# Patient Record
Sex: FEMALE | Employment: OTHER | URBAN - METROPOLITAN AREA
[De-identification: names, ages, dates, MRNs, and addresses within clinical notes are randomized per-mention and may not be internally consistent; named-entity substitution may affect disease eponyms.]

---

## 2022-11-09 ENCOUNTER — OFFICE VISIT (OUTPATIENT)
Dept: UROGYNECOLOGY | Age: 72
End: 2022-11-09
Payer: MEDICARE

## 2022-11-09 VITALS
OXYGEN SATURATION: 99 % | HEART RATE: 59 BPM | DIASTOLIC BLOOD PRESSURE: 58 MMHG | RESPIRATION RATE: 16 BRPM | TEMPERATURE: 97.8 F | SYSTOLIC BLOOD PRESSURE: 150 MMHG

## 2022-11-09 DIAGNOSIS — R30.0 DYSURIA: ICD-10-CM

## 2022-11-09 DIAGNOSIS — N39.0 RECURRENT UTI: Primary | ICD-10-CM

## 2022-11-09 DIAGNOSIS — N95.2 VAGINAL ATROPHY: ICD-10-CM

## 2022-11-09 LAB
BILIRUBIN, POC: NORMAL
BLOOD URINE, POC: NORMAL
CLARITY, POC: CLEAR
COLOR, POC: YELLOW
GLUCOSE URINE, POC: NORMAL
KETONES, POC: NORMAL
LEUKOCYTE EST, POC: NORMAL
NITRITE, POC: NORMAL
PH, POC: 6.5
PROTEIN, POC: NORMAL
SPECIFIC GRAVITY, POC: 1.01
UROBILINOGEN, POC: NORMAL

## 2022-11-09 PROCEDURE — 1123F ACP DISCUSS/DSCN MKR DOCD: CPT | Performed by: NURSE PRACTITIONER

## 2022-11-09 PROCEDURE — 1036F TOBACCO NON-USER: CPT | Performed by: NURSE PRACTITIONER

## 2022-11-09 PROCEDURE — G8421 BMI NOT CALCULATED: HCPCS | Performed by: NURSE PRACTITIONER

## 2022-11-09 PROCEDURE — 81002 URINALYSIS NONAUTO W/O SCOPE: CPT | Performed by: NURSE PRACTITIONER

## 2022-11-09 PROCEDURE — 99204 OFFICE O/P NEW MOD 45 MIN: CPT | Performed by: NURSE PRACTITIONER

## 2022-11-09 PROCEDURE — G8484 FLU IMMUNIZE NO ADMIN: HCPCS | Performed by: NURSE PRACTITIONER

## 2022-11-09 PROCEDURE — 3017F COLORECTAL CA SCREEN DOC REV: CPT | Performed by: NURSE PRACTITIONER

## 2022-11-09 PROCEDURE — G8400 PT W/DXA NO RESULTS DOC: HCPCS | Performed by: NURSE PRACTITIONER

## 2022-11-09 PROCEDURE — 51701 INSERT BLADDER CATHETER: CPT | Performed by: NURSE PRACTITIONER

## 2022-11-09 PROCEDURE — G8427 DOCREV CUR MEDS BY ELIG CLIN: HCPCS | Performed by: NURSE PRACTITIONER

## 2022-11-09 PROCEDURE — 1090F PRES/ABSN URINE INCON ASSESS: CPT | Performed by: NURSE PRACTITIONER

## 2022-11-09 RX ORDER — FLUCONAZOLE 150 MG/1
150 TABLET ORAL ONCE
Qty: 1 TABLET | Refills: 0 | Status: SHIPPED | OUTPATIENT
Start: 2022-11-09 | End: 2022-11-09

## 2022-11-09 RX ORDER — PHENAZOPYRIDINE HYDROCHLORIDE 100 MG/1
100 TABLET, FILM COATED ORAL 3 TIMES DAILY PRN
Qty: 18 TABLET | Refills: 0 | Status: SHIPPED | OUTPATIENT
Start: 2022-11-09 | End: 2022-11-15

## 2022-11-09 RX ORDER — METOPROLOL SUCCINATE 50 MG/1
TABLET, EXTENDED RELEASE ORAL
COMMUNITY
Start: 2022-11-04

## 2022-11-09 RX ORDER — SODIUM FLUORIDE AND POTASSIUM NITRATE 5.8; 57.5 MG/ML; MG/ML
GEL, DENTIFRICE DENTAL
COMMUNITY
Start: 2021-01-24

## 2022-11-09 RX ORDER — PHENAZOPYRIDINE HYDROCHLORIDE 200 MG/1
TABLET, FILM COATED ORAL
COMMUNITY
Start: 2022-11-01

## 2022-11-09 RX ORDER — ATORVASTATIN CALCIUM 20 MG/1
20 TABLET, FILM COATED ORAL DAILY
COMMUNITY
Start: 2022-10-07

## 2022-11-09 RX ORDER — OMEPRAZOLE 40 MG/1
40 CAPSULE, DELAYED RELEASE ORAL 2 TIMES DAILY
COMMUNITY
Start: 2022-10-07

## 2022-11-09 RX ORDER — CEFDINIR 300 MG/1
300 CAPSULE ORAL 2 TIMES DAILY
Qty: 14 CAPSULE | Refills: 0 | Status: SHIPPED | OUTPATIENT
Start: 2022-11-09 | End: 2022-11-16

## 2022-11-09 RX ORDER — ELUXADOLINE 100 MG/1
TABLET, FILM COATED ORAL
COMMUNITY
Start: 2022-10-07

## 2022-11-09 RX ORDER — ESCITALOPRAM OXALATE 10 MG/1
5 TABLET ORAL 2 TIMES DAILY
COMMUNITY
Start: 2021-10-06

## 2022-11-09 RX ORDER — LOPERAMIDE HYDROCHLORIDE 2 MG/1
4 CAPSULE ORAL 3 TIMES DAILY PRN
COMMUNITY

## 2022-11-09 RX ORDER — GABAPENTIN 300 MG/1
CAPSULE ORAL
COMMUNITY
Start: 2022-08-11

## 2022-11-09 RX ORDER — CEFDINIR 300 MG/1
CAPSULE ORAL
COMMUNITY
Start: 2022-10-28

## 2022-11-09 RX ORDER — METHENAMINE, SODIUM PHOSPHATE, MONOBASIC, MONOHYDRATE, PHENYL SALICYLATE, METHYLENE BLUE, AND HYOSCYAMINE SULFATE 120; 40.8; 36; 10; .12 MG/1; MG/1; MG/1; MG/1; MG/1
CAPSULE ORAL
COMMUNITY
Start: 2022-10-03

## 2022-11-09 RX ORDER — ESTRADIOL 0.1 MG/G
CREAM VAGINAL
COMMUNITY
Start: 2022-08-10

## 2022-11-09 RX ORDER — ZOLPIDEM TARTRATE 10 MG/1
TABLET ORAL
COMMUNITY
Start: 2022-08-08

## 2022-11-09 ASSESSMENT — ENCOUNTER SYMPTOMS
SINUS PAIN: 1
DIARRHEA: 1
SINUS PRESSURE: 1

## 2022-11-09 NOTE — PROGRESS NOTES
11/9/2022      HPI:     Name: De Acuna  YOB: 1950    CC: Patient is a 67 y.o. female who is seen in consultation from No ref. provider found   for evaluation of  recurrent UTI's . HPI: Patient and  present today to transfer care for ongoing recurrent UTI's  Has seen Dr. Brandi Mena, Dr. Lainey Mondragon and Dr. Maricruz Wilhelm in the past  Was on Monural q 8 days and switched to Trimpex in September   Fosfomycin was helpful for almost 8 years, then no longer worked. Hysterectomy and prolapse repairs with Dr. Lainey Mondragon > 20 years ago. Believes she had UTI's at that time as well. Current therapy:   Cranberry supplement and topical estrogen cream and daily Macrodantin 50 mg.  (Macrodantin was left over and patient chose to start it after last treatment while waiting for this visit)     10/28/22: Klebsiella (R: bactrim, I: Macrobid) treated with Cefdinir and feels better. Completed treatment 11/4/22  10/10/22: Klebsiella  10/1/22: Neg/MBU  9/15/22: Klebsiella  8/31/22: Klebsiella  7/20/22: Klebsiella  All are intermediate susceptibility to Nitrofurantoin    Triggers: intimacy and FI accidents secondary to IBS  Denies prolapse  Washed aggressively with washcloth after each urination until recently advised to stop by ARNP    Has not had cystoscopy: has been advised of need. Cx her appt at [facility] had [facility]scan:    10/06/2022 3:56 PM EDT    1. No acute findings or abnormalities to explain the patient's symptoms. 2. Incidental findings as described        Note: Radiology results need to be interpreted within a comprehensive clinical  context. If you have questions about the radiology report, please contact the  office of the ordering clinician.        Imaging Results - CT ABDOMEN PELVIS W WO CONTRAST (10/06/2022 3:35 PM EDT)  Narrative   10/06/2022 3:56 PM EDT    CLINICAL HISTORY: N39.0-Urinary tract infection, site not specified-ICD-10-CM  N39.0-Urinary tract infection, site not specified-ICD-10-CM. COMPARISON: None. TECHNIQUE: CT ABDOMEN PELVIS W WO CONTRAST on 10/6/2022 3:35 PM. 100 mL of  intravenous Isovue-370 was administered. Dose 1 : CT  DLP Total : 1972.56 mGycm  DLP Spiral Max : 658.2 mGycm  Maximum CTDI Vol : 13.93 mGy  SSDE : 11.0047 mGy  SSDE Diameter : 42.2 cm  SSDE Source : Lat    FINDINGS:     Abdomen: The lung bases are clear. The visualized portions of the heart are  normal.    The liver, gallbladder, spleen, pancreas, adrenal glands, and kidneys are  normal. There is no collecting system calcification or dilation The stomach and  caliber of the small bowel are normal. There are no enlarged abdominal lymph  nodes or free fluid. The caliber of the abdominal aorta is normal. Degenerative  changes are in the spine. Pelvis: The caliber of the colon is normal. The appendix is unremarkable. The  uterus has been removed. There are no enlarged pelvic lymph nodes or free fluid. The urinary bladder and adnexa are normal. The bony pelvis is unremarkable. Bladder control problem: Yes   How many months have you had a bladder problem? 1 years  Do you use pads to absorb lost urine? Yes. If yes how many pads do you wear a day? 1   How many trips do you make to the bathroom during the day? About every 2-3 hours  How many times do you wake at night to go to the bathroom? once  Do you ever wet the bed while asleep? No  Are there times when you cannot make it to the bathroom on time? Yes  Does sound, sight, or feel of running water cause you to lose urine? Yes  How many ounces of liquid do you consume daily? 32 oz  How many drinks containing caffeine do you consume daily?  Caffeine free herbal tea  Which best describes urine loss: (Check all that apply)  [] I lose urine during coughing, sneezing, running, lifting  [] I lose urine with changes in posture, standing, walking  [] I lose urine continuously such that I am constantly wet  [x] I have sudden, urgent needs without the ability to make it to the bathroom (From bed to bathroom only)  Have you seen a physician for complaints of urine loss? No  Have you taken medication to prevent urine loss? No    Bladder emptying problems:  No   Prolapse/Vaginal Support problems: No   Bowel problem(s): Yes  How long have you had bowel symptoms? years  Do you have accidental loss of solid stool? No  Do you have accidental loss of liquid stool? Yes  Do you have accidental loss of gas? Yes  How many episodes per week? Once at most  Do you have constipation? No Do you have diarrhea? Yes Problems with bloating? No  Do you have frequent desire to have a bowel movement? No  Do you feel that your bowels are never completely empty? No  Do you ever place your fingers in your vagina or between the vagina and rectum to help with a bowel movement? No  Have you seen a physician for bowel symptoms? No    Sexual History:  reports that she is not currently sexually active. Pelvic Pain:  Yes  Where is your pain? Vagina  How long have you had pelvic pain? years  Is your pain relieved by bladder emptying? No  Do you have pain with urination? No  Does anything relieve the pain? No        Ob/Gyn History:    OB History    Para Term  AB Living   3 3           SAB IAB Ectopic Molar Multiple Live Births                    # Outcome Date GA Lbr Anderson/2nd Weight Sex Delivery Anes PTL Lv   3 Para            2 Para            1 Para              Past Medical History:   Past Medical History:   Diagnosis Date    Depression     Hypertension      Past Surgical History:   Past Surgical History:   Procedure Laterality Date    HYSTERECTOMY (CERVIX STATUS UNKNOWN)      ZULEMA AND BSO (CERVIX REMOVED)       Allergies:    Allergies   Allergen Reactions    Clindamycin Diarrhea     Nausea   Nausea       Iodinated Diagnostic Agents Hives     IVP Dye  Other reaction(s): hives      Amlodipine Swelling     ankles     Current Medications:  Current Outpatient Medications   Medication Sig Dispense Refill    escitalopram (LEXAPRO) 10 MG tablet Take 5 mg by mouth 2 times daily      omeprazole (PRILOSEC) 40 MG delayed release capsule Take 40 mg by mouth 2 times daily      atorvastatin (LIPITOR) 20 MG tablet Take 20 mg by mouth daily      Sod Fluoride-Potassium Nitrate (PREVIDENT 5000 ENAMEL PROTECT) 1.1-5 % GEL BRUSH WITH PASTE 1-2 TIMES DAILY      cefdinir (OMNICEF) 300 MG capsule Take 1 capsule by mouth 2 times daily for 7 days 14 capsule 0    fluconazole (DIFLUCAN) 150 MG tablet Take 1 tablet by mouth once for 1 dose 1 tablet 0    phenazopyridine (PYRIDIUM) 100 MG tablet Take 1 tablet by mouth 3 times daily as needed for Pain 18 tablet 0    metoprolol succinate (TOPROL XL) 50 MG extended release tablet TAKE 1 TABLET BY MOUTH DAILY      VIBERZI 100 MG TABS TAKE 1 TABLET BY MOUTH TWICE DAILY      estradiol (ESTRACE) 0.1 MG/GM vaginal cream PLACE IN THE VAGINA EVERY THIRD NIGHT      gabapentin (NEURONTIN) 300 MG capsule TAKE 1 CAPSULE BY MOUTH EVERY NIGHT      zolpidem (AMBIEN) 10 MG tablet TAKE 1 TABLET BY MOUTH EVERY NIGHT AS NEEDED FOR SLEEP      phenazopyridine (PYRIDIUM) 200 MG tablet TAKE ONE TABLET BY MOUTH THREE TIMES DAILY      Meth-Hyo-M Bl-Na Phos-Ph Sal (URIBEL) 118 MG CAPS TAKE 1 CAPSULE BY MOUTH EVERY 8 HOURS AS NEEDED FOR BLADDER PAIN      loperamide (IMODIUM) 2 MG capsule Take 4 mg by mouth 3 times daily as needed      cefdinir (OMNICEF) 300 MG capsule TAKE 1 CAPSULE BY MOUTH TWICE DAILY FOR 7 DAYS      calcium citrate-vitamin D (CITRACAL+D) 315-200 MG-UNIT per tablet Take 1 tablet by mouth 2 times daily      Calcium Carbonate-Vitamin D 600-200 MG-UNIT CAPS Take 3 tablets by mouth daily       No current facility-administered medications for this visit.      Social History:   Social History     Socioeconomic History    Marital status:      Spouse name: Not on file    Number of children: Not on file    Years of education: Not on file    Highest education level: Not on file Occupational History    Not on file   Tobacco Use    Smoking status: Never    Smokeless tobacco: Never   Vaping Use    Vaping Use: Never used   Substance and Sexual Activity    Alcohol use: Yes     Alcohol/week: 7.0 standard drinks     Types: 7 Glasses of wine per week    Drug use: Never    Sexual activity: Not Currently   Other Topics Concern    Not on file   Social History Narrative    Not on file     Social Determinants of Health     Financial Resource Strain: Not on file   Food Insecurity: Not on file   Transportation Needs: Not on file   Physical Activity: Not on file   Stress: Not on file   Social Connections: Not on file   Intimate Partner Violence: Not on file   Housing Stability: Not on file     Family History:   Family History   Problem Relation Age of Onset    Hypertension Mother     Hypertension Father     Cancer Father      Review of Systems:  Review of Systems   HENT:  Positive for postnasal drip, sinus pressure and sinus pain. Gastrointestinal:  Positive for diarrhea. Genitourinary:  Positive for enuresis and frequency. Hematological:  Bruises/bleeds easily. All other systems reviewed and are negative. Objective:     Vital Signs  Vitals:    11/09/22 1303   BP: (!) 150/58   Pulse: 59   Resp: 16   Temp: 97.8 °F (36.6 °C)   SpO2: 99%     Physical Exam  Physical Exam  Vitals and nursing note reviewed. Constitutional:       Appearance: Normal appearance. HENT:      Head: Normocephalic. Eyes:      Conjunctiva/sclera: Conjunctivae normal.   Cardiovascular:      Rate and Rhythm: Normal rate. Pulmonary:      Effort: Pulmonary effort is normal.   Genitourinary:     Comments: Large amount of estrogen cream  Musculoskeletal:         General: Normal range of motion. Cervical back: Normal range of motion. Skin:     General: Skin is warm and dry. Neurological:      General: No focal deficit present. Mental Status: She is alert.    Psychiatric:         Mood and Affect: Mood normal.         Behavior: Behavior normal.           Office Fill Study/Urine Dip:       Results for POC orders placed in visit on 11/09/22   POCT Urinalysis no Micro   Result Value Ref Range    Color, UA yellow     Clarity, UA clear     Glucose, UA POC neg     Bilirubin, UA neg     Ketones, UA neg     Spec Grav, UA 1.015     Blood, UA POC neg     pH, UA 6.5     Protein, UA POC neg     Urobilinogen, UA neg     Leukocytes, UA neg     Nitrite, UA neg      Straight urinary catheterization performed by sterile procedure for urine specimen per Haylie Hung NP.  80ml post void. Patient tolerated well. Assessment/Plan: Arron Crisostomo is a 67 y.o. female with   1. Recurrent UTI    2. Dysuria    3. Vaginal atrophy    -Records reviewed  -Prior urine cultures reviewed and discussed with patient  Recurrent UTI:  Diagnosis and management of recurrent UTI reviewed with patient. Additional benefits of twice daily cranberry, d-mannose and methenamine discussed  Currently using Ellura  Aware of role for daily or self-start antibiotics  Informed how post and perimenopausal women are more susceptible to UTI's due to lack of estrogen  Vulvar care and estrogen cream use instructions reviewed    Understands the need for culture if symptomatic - outlined advantage of cath specimen  Also counseled on need for cystoscopy. Patient to return here for UTI symptoms. -PVR: WNL today  Urine sent for culture. Completed treatment 11/4/22 Cefdinir for 7 days. As she does have concern for infection due to catheterization I did sent Cefdinir to her pharmacy    If culture is negative, will need to stop Cefdinir begin daily prophylaxis we discussed her current Macrobid is not beneficial based on prior culture. Trimpex also not sensitive  If culture positive will plan accordingly. Cystoscopy scheduled with Dr. Bree Grady.   Bella Laird, ADELINA - CNP      Orders Placed This Encounter   Procedures    Culture, Urine     Order Specific Question:   Specify (ex-cath, midstream, cysto, etc)?      Answer:   straight cath    POCT Urinalysis no Micro    [place] INSERT,NON-INDWELLING BLADDER CATHETER       Orders Placed This Encounter   Medications    cefdinir (OMNICEF) 300 MG capsule     Sig: Take 1 capsule by mouth 2 times daily for 7 days     Dispense:  14 capsule     Refill:  0    fluconazole (DIFLUCAN) 150 MG tablet     Sig: Take 1 tablet by mouth once for 1 dose     Dispense:  1 tablet     Refill:  0    phenazopyridine (PYRIDIUM) 100 MG tablet     Sig: Take 1 tablet by mouth 3 times daily as needed for Pain     Dispense:  18 tablet     Refill:  0       ADELINA Banegas - CNP

## 2022-11-11 ENCOUNTER — TELEPHONE (OUTPATIENT)
Dept: UROGYNECOLOGY | Age: 72
End: 2022-11-11

## 2022-11-11 NOTE — TELEPHONE ENCOUNTER
Advised that preliminary results look negative, but poli wants her to continue antibiotic, and she will call her Monday after the culture is final.  Pt and  were thankful for the call.

## 2022-11-12 LAB — URINE CULTURE, ROUTINE: NORMAL

## 2022-11-14 DIAGNOSIS — N39.0 RECURRENT UTI: Primary | ICD-10-CM

## 2022-11-14 RX ORDER — DOXYCYCLINE HYCLATE 100 MG
100 TABLET ORAL DAILY
Qty: 30 TABLET | Refills: 2 | Status: SHIPPED | OUTPATIENT
Start: 2022-11-14 | End: 2022-12-14

## 2022-11-16 ENCOUNTER — TELEPHONE (OUTPATIENT)
Dept: UROGYNECOLOGY | Age: 72
End: 2022-11-16

## 2022-11-16 NOTE — TELEPHONE ENCOUNTER
Called patient, answered questions regarding her prophy medication that she recently started. Patient thankful for call, no other needs at this time.

## 2022-11-28 ENCOUNTER — TELEPHONE (OUTPATIENT)
Dept: UROGYNECOLOGY | Age: 72
End: 2022-11-28

## 2022-11-28 NOTE — TELEPHONE ENCOUNTER
Spoke with patient over the phone, and informed her she does not need any additional antibiotics for the cystoscopy and she should continue taking her prophylactic doxycycline as prescribed. No need to modify when she takes it during the day due to an upcoming cysto tomorrow. Patient requested she be given diflucan because she gets yeast infections often. Advised her to discuss with provider at her upcoming appointment tomorrow to see if our office will manage that for her. Patient is thankful for the call.

## 2022-11-28 NOTE — TELEPHONE ENCOUNTER
Patient asking if she should be on an antibiotic for the cysto because she gets so many infections. She is currently not experiencing any symptoms.  Thank you

## 2022-11-29 ENCOUNTER — PROCEDURE VISIT (OUTPATIENT)
Dept: UROGYNECOLOGY | Age: 72
End: 2022-11-29
Payer: MEDICARE

## 2022-11-29 VITALS
RESPIRATION RATE: 16 BRPM | DIASTOLIC BLOOD PRESSURE: 81 MMHG | HEART RATE: 66 BPM | SYSTOLIC BLOOD PRESSURE: 129 MMHG | TEMPERATURE: 97 F | OXYGEN SATURATION: 97 %

## 2022-11-29 DIAGNOSIS — R30.0 DYSURIA: ICD-10-CM

## 2022-11-29 DIAGNOSIS — N39.0 RECURRENT UTI: Primary | ICD-10-CM

## 2022-11-29 DIAGNOSIS — N95.2 VAGINAL ATROPHY: ICD-10-CM

## 2022-11-29 PROCEDURE — 52285 CYSTOSCOPY AND TREATMENT: CPT | Performed by: OBSTETRICS & GYNECOLOGY

## 2022-11-29 RX ORDER — LISINOPRIL 10 MG/1
TABLET ORAL
COMMUNITY
Start: 2022-11-16

## 2022-11-29 NOTE — LETTER
[facility]  [address] [place] [address] [address] [facility] [address]  Phone: [phone]  Fax: [phone]    Phani Thomas MD    2022     Isaac Nettles DO  [address]    Patient: Judith Benson   MR Number: [id number]   YOB: 1950   Date of Visit: 2022       Dear Isaac Nettles:    Thank you for referring Troy Coffman to me for evaluation/treatment. Below are the relevant portions of my assessment and plan of care. If you have questions, please do not hesitate to call me. I look forward to following Henok Zavala along with you. Sincerely,      Phani Thomas MD   2022     HPI:     Name: Judith Benson  YOB: 1950    CC: Judith Benson is a 67 y.o. female presenting for an evaluation of  recurrent UTI's . HPI: How long have you had this problem? years  Please rate the severity of your problem: moderate  Anything make it better?     Taking Prophylactic antibiotics - Doxycycline 100mg, using estrogen cream. Concerned about risk of infection with cystoscopy today    Ob/Gyn History:    OB History    Para Term  AB Living   3 3           SAB IAB Ectopic Molar Multiple Live Births                    # Outcome Date GA Lbr Anderson/2nd Weight Sex Delivery Anes PTL Lv   3 Para            2 Para            1 Para              Past Medical History:   Past Medical History:   Diagnosis Date    Depression     Hypertension      Past Surgical History:   Past Surgical History:   Procedure Laterality Date    HYSTERECTOMY (CERVIX STATUS UNKNOWN)      ZULEMA AND BSO (CERVIX REMOVED)       Current Medications:  Current Outpatient Medications   Medication Sig Dispense Refill    lisinopril (PRINIVIL;ZESTRIL) 10 MG tablet TAKE 1 TABLET BY MOUTH DAILY      doxycycline hyclate (VIBRA-TABS) 100 MG tablet Take 1 tablet by mouth daily 30 tablet 2    escitalopram (LEXAPRO) 10 MG tablet Take 5 mg by mouth 2 times daily      omeprazole (PRILOSEC) 40 MG delayed release capsule Take 40 mg by mouth 2 times daily      atorvastatin (LIPITOR) 20 MG tablet Take 20 mg by mouth daily      metoprolol succinate (TOPROL XL) 50 MG extended release tablet TAKE 1 TABLET BY MOUTH DAILY      VIBERZI 100 MG TABS TAKE 1 TABLET BY MOUTH TWICE DAILY      estradiol (ESTRACE) 0.1 MG/GM vaginal cream PLACE IN THE VAGINA EVERY THIRD NIGHT      gabapentin (NEURONTIN) 300 MG capsule TAKE 1 CAPSULE BY MOUTH EVERY NIGHT      zolpidem (AMBIEN) 10 MG tablet TAKE 1 TABLET BY MOUTH EVERY NIGHT AS NEEDED FOR SLEEP      Sod Fluoride-Potassium Nitrate (PREVIDENT 5000 ENAMEL PROTECT) 1.1-5 % GEL BRUSH WITH PASTE 1-2 TIMES DAILY      phenazopyridine (PYRIDIUM) 200 MG tablet TAKE ONE TABLET BY MOUTH THREE TIMES DAILY      Meth-Hyo-M Bl-Na Phos-Ph Sal (URIBEL) 118 MG CAPS TAKE 1 CAPSULE BY MOUTH EVERY 8 HOURS AS NEEDED FOR BLADDER PAIN      loperamide (IMODIUM) 2 MG capsule Take 4 mg by mouth 3 times daily as needed      cefdinir (OMNICEF) 300 MG capsule TAKE 1 CAPSULE BY MOUTH TWICE DAILY FOR 7 DAYS      calcium citrate-vitamin D (CITRACAL+D) 315-200 MG-UNIT per tablet Take 1 tablet by mouth 2 times daily      Calcium Carbonate-Vitamin D 600-200 MG-UNIT CAPS Take 3 tablets by mouth daily       No current facility-administered medications for this visit. Allergies:    Allergies   Allergen Reactions    Clindamycin Diarrhea     Nausea   Nausea       Iodinated Diagnostic Agents Hives     IVP Dye  Other reaction(s): hives      Amlodipine Swelling     ankles     Social History:   Social History     Socioeconomic History    Marital status:      Spouse name: Not on file    Number of children: Not on file    Years of education: Not on file    Highest education level: Not on file   Occupational History    Not on file   Tobacco Use    Smoking status: Never    Smokeless tobacco: Never   Vaping Use    Vaping Use: Never used   Substance and Sexual Activity    Alcohol use: Yes     Alcohol/week: 7.0 standard drinks     Types: 7 Glasses of wine per week    Drug use: Never    Sexual activity: Not Currently   Other Topics Concern    Not on file   Social History Narrative    Not on file     Social Determinants of Health     Financial Resource Strain: Not on file   Food Insecurity: Not on file   Transportation Needs: Not on file   Physical Activity: Not on file   Stress: Not on file   Social Connections: Not on file   Intimate Partner Violence: Not on file   Housing Stability: Not on file     Family History:   Family History   Problem Relation Age of Onset    Hypertension Mother     Hypertension Father     Cancer Father          Objective:     Vital Signs  Vitals:    11/29/22 1509   BP: 129/81   Pulse: 66   Resp: 16   Temp: 97 °F (36.1 °C)   SpO2: 97%      Physical Exam  Physical Exam  HENT:      Head: Normocephalic and atraumatic. Eyes:      Conjunctiva/sclera: Conjunctivae normal.   Pulmonary:      Effort: Pulmonary effort is normal.   Abdominal:      Palpations: Abdomen is soft. Musculoskeletal:      Cervical back: Normal range of motion and neck supple. Skin:     General: Skin is warm and dry. Neurological:      Mental Status: She is alert and oriented to person, place, and time. Procedure: The urethral was anesthesized with topical lidocaine jelly and dilated up to a #14 Faroese. A 0-degree urethroscope was used to examine the urethra. A 70-degree cystoscope was used to evaluate the bladder. FINDINGS:  1. Urethra was normal  2. Bladder had trabeculations mild  3. Trigone appeared Normal  4. Ureters illustrated bilateral efflux  5. Patient exhibited spasms during the study no      No results found for this visit on 11/29/22. Assessment/Plan: Bonnie Estrada is a 67 y.o. female with   1. Recurrent UTI    2. Dysuria    3.  Vaginal atrophy    Old records reviewed, outside records reviewed, cystourethroscopy was reviewed    [facility] presents today as a follow-up for recurrent urinary tract infections. She is currently on doxycycline suppression and has done very well with this. She will continue with this and call us if she has any further problems she will follow-up with Maria Luisa Jacob in approximately 10 to 12 weeks. No orders of the defined types were placed in this encounter. No orders of the defined types were placed in this encounter.       Davis Grossman MD

## 2022-11-29 NOTE — PROGRESS NOTES
2022     HPI:     Name: Kyle Roy  YOB: 1950    CC: Kyle Roy is a 67 y.o. female presenting for an evaluation of  recurrent UTI's . HPI: How long have you had this problem? years  Please rate the severity of your problem: moderate  Anything make it better?     Taking Prophylactic antibiotics - Doxycycline 100mg, using estrogen cream. Concerned about risk of infection with cystoscopy today    Ob/Gyn History:    OB History    Para Term  AB Living   3 3           SAB IAB Ectopic Molar Multiple Live Births                    # Outcome Date GA Lbr Anderson/2nd Weight Sex Delivery Anes PTL Lv   3 Para            2 Para            1 Para              Past Medical History:   Past Medical History:   Diagnosis Date    Depression     Hypertension      Past Surgical History:   Past Surgical History:   Procedure Laterality Date    HYSTERECTOMY (CERVIX STATUS UNKNOWN)      ZULEMA AND BSO (CERVIX REMOVED)       Current Medications:  Current Outpatient Medications   Medication Sig Dispense Refill    lisinopril (PRINIVIL;ZESTRIL) 10 MG tablet TAKE 1 TABLET BY MOUTH DAILY      doxycycline hyclate (VIBRA-TABS) 100 MG tablet Take 1 tablet by mouth daily 30 tablet 2    escitalopram (LEXAPRO) 10 MG tablet Take 5 mg by mouth 2 times daily      omeprazole (PRILOSEC) 40 MG delayed release capsule Take 40 mg by mouth 2 times daily      atorvastatin (LIPITOR) 20 MG tablet Take 20 mg by mouth daily      metoprolol succinate (TOPROL XL) 50 MG extended release tablet TAKE 1 TABLET BY MOUTH DAILY      VIBERZI 100 MG TABS TAKE 1 TABLET BY MOUTH TWICE DAILY      estradiol (ESTRACE) 0.1 MG/GM vaginal cream PLACE IN THE VAGINA EVERY THIRD NIGHT      gabapentin (NEURONTIN) 300 MG capsule TAKE 1 CAPSULE BY MOUTH EVERY NIGHT      zolpidem (AMBIEN) 10 MG tablet TAKE 1 TABLET BY MOUTH EVERY NIGHT AS NEEDED FOR SLEEP      Sod Fluoride-Potassium Nitrate (PREVIDENT 5000 ENAMEL PROTECT) 1.1-5 % GEL BRUSH WITH PASTE 1-2 TIMES DAILY      phenazopyridine (PYRIDIUM) 200 MG tablet TAKE ONE TABLET BY MOUTH THREE TIMES DAILY      Meth-Hyo-M Bl-Na Phos-Ph Sal (URIBEL) 118 MG CAPS TAKE 1 CAPSULE BY MOUTH EVERY 8 HOURS AS NEEDED FOR BLADDER PAIN      loperamide (IMODIUM) 2 MG capsule Take 4 mg by mouth 3 times daily as needed      cefdinir (OMNICEF) 300 MG capsule TAKE 1 CAPSULE BY MOUTH TWICE DAILY FOR 7 DAYS      calcium citrate-vitamin D (CITRACAL+D) 315-200 MG-UNIT per tablet Take 1 tablet by mouth 2 times daily      Calcium Carbonate-Vitamin D 600-200 MG-UNIT CAPS Take 3 tablets by mouth daily       No current facility-administered medications for this visit. Allergies: Allergies   Allergen Reactions    Clindamycin Diarrhea     Nausea   Nausea       Iodinated Diagnostic Agents Hives     IVP Dye  Other reaction(s): hives      Amlodipine Swelling     ankles     Social History:   Social History     Socioeconomic History    Marital status:      Spouse name: Not on file    Number of children: Not on file    Years of education: Not on file    Highest education level: Not on file   Occupational History    Not on file   Tobacco Use    Smoking status: Never    Smokeless tobacco: Never   Vaping Use    Vaping Use: Never used   Substance and Sexual Activity    Alcohol use:  Yes     Alcohol/week: 7.0 standard drinks     Types: 7 Glasses of wine per week    Drug use: Never    Sexual activity: Not Currently   Other Topics Concern    Not on file   Social History Narrative    Not on file     Social Determinants of Health     Financial Resource Strain: Not on file   Food Insecurity: Not on file   Transportation Needs: Not on file   Physical Activity: Not on file   Stress: Not on file   Social Connections: Not on file   Intimate Partner Violence: Not on file   Housing Stability: Not on file     Family History:   Family History   Problem Relation Age of Onset    Hypertension Mother     Hypertension Father     Cancer Father          Objective: Vital Signs  Vitals:    11/29/22 1509   BP: 129/81   Pulse: 66   Resp: 16   Temp: 97 °F (36.1 °C)   SpO2: 97%      Physical Exam  Physical Exam  HENT:      Head: Normocephalic and atraumatic. Eyes:      Conjunctiva/sclera: Conjunctivae normal.   Pulmonary:      Effort: Pulmonary effort is normal.   Abdominal:      Palpations: Abdomen is soft. Musculoskeletal:      Cervical back: Normal range of motion and neck supple. Skin:     General: Skin is warm and dry. Neurological:      Mental Status: She is alert and oriented to person, place, and time. Procedure: The urethral was anesthesized with topical lidocaine jelly and dilated up to a #14 Nepali. A 0-degree urethroscope was used to examine the urethra. A 70-degree cystoscope was used to evaluate the bladder. FINDINGS:  1. Urethra was normal  2. Bladder had trabeculations mild  3. Trigone appeared Normal  4. Ureters illustrated bilateral efflux  5. Patient exhibited spasms during the study no      No results found for this visit on 11/29/22. Assessment/Plan: Nevaeh Salmon is a 67 y.o. female with   1. Recurrent UTI    2. Dysuria    3. Vaginal atrophy    Old records reviewed, outside records reviewed, cystourethroscopy was reviewed    Mercedes Mercury presents today as a follow-up for recurrent urinary tract infections. She is currently on doxycycline suppression and has done very well with this. She will continue with this and call us if she has any further problems she will follow-up with Wing Calhoun in approximately 10 to 12 weeks. No orders of the defined types were placed in this encounter. No orders of the defined types were placed in this encounter.       Ruddy Ron MD

## 2022-12-30 ENCOUNTER — TELEPHONE (OUTPATIENT)
Dept: UROGYNECOLOGY | Age: 72
End: 2022-12-30

## 2022-12-30 NOTE — TELEPHONE ENCOUNTER
The medication that patient is on nightly is causing her to itch terribly. Is there something can ease this?  Thank you

## 2023-01-30 ENCOUNTER — TELEPHONE (OUTPATIENT)
Dept: UROGYNECOLOGY | Age: 73
End: 2023-01-30

## 2023-01-30 NOTE — TELEPHONE ENCOUNTER
Received a message from Dr. Taylor Zacarias that patient called over the weekend for a yeast infection. He called her in [facility]. He advised that we call the patient today Monday to see if she is better and if she is, for her to follow up with Naga Fraser later in the week. Called patient to inquire how she was feeling. She reported symptoms have improved. Appt made with Naga Fraser for Thursday per Dr. Taylor Zacarias. Patient grateful for the call, no further questions or concerns at this time.

## 2023-01-31 ENCOUNTER — TELEPHONE (OUTPATIENT)
Dept: UROGYNECOLOGY | Age: 73
End: 2023-01-31

## 2023-01-31 NOTE — TELEPHONE ENCOUNTER
Pt called and had to cancel appt due to a family . Has an appt scheduled for later in Feb, wants to know if she needs to be seen before then though

## 2023-02-03 DIAGNOSIS — N39.0 RECURRENT UTI: ICD-10-CM

## 2023-02-03 RX ORDER — DOXYCYCLINE HYCLATE 100 MG
100 TABLET ORAL DAILY
Qty: 30 TABLET | Refills: 0 | Status: SHIPPED | OUTPATIENT
Start: 2023-02-03 | End: 2023-03-05

## 2023-02-13 ENCOUNTER — OFFICE VISIT (OUTPATIENT)
Dept: UROGYNECOLOGY | Age: 73
End: 2023-02-13
Payer: MEDICARE

## 2023-02-13 VITALS
TEMPERATURE: 97.8 F | DIASTOLIC BLOOD PRESSURE: 69 MMHG | HEART RATE: 68 BPM | SYSTOLIC BLOOD PRESSURE: 128 MMHG | RESPIRATION RATE: 16 BRPM | OXYGEN SATURATION: 97 %

## 2023-02-13 DIAGNOSIS — N95.2 VAGINAL ATROPHY: ICD-10-CM

## 2023-02-13 DIAGNOSIS — N39.0 RECURRENT UTI: Primary | ICD-10-CM

## 2023-02-13 PROCEDURE — G8421 BMI NOT CALCULATED: HCPCS | Performed by: NURSE PRACTITIONER

## 2023-02-13 PROCEDURE — 1036F TOBACCO NON-USER: CPT | Performed by: NURSE PRACTITIONER

## 2023-02-13 PROCEDURE — 3017F COLORECTAL CA SCREEN DOC REV: CPT | Performed by: NURSE PRACTITIONER

## 2023-02-13 PROCEDURE — 1090F PRES/ABSN URINE INCON ASSESS: CPT | Performed by: NURSE PRACTITIONER

## 2023-02-13 PROCEDURE — G8427 DOCREV CUR MEDS BY ELIG CLIN: HCPCS | Performed by: NURSE PRACTITIONER

## 2023-02-13 PROCEDURE — G8400 PT W/DXA NO RESULTS DOC: HCPCS | Performed by: NURSE PRACTITIONER

## 2023-02-13 PROCEDURE — 99213 OFFICE O/P EST LOW 20 MIN: CPT | Performed by: NURSE PRACTITIONER

## 2023-02-13 PROCEDURE — 1123F ACP DISCUSS/DSCN MKR DOCD: CPT | Performed by: NURSE PRACTITIONER

## 2023-02-13 PROCEDURE — G8484 FLU IMMUNIZE NO ADMIN: HCPCS | Performed by: NURSE PRACTITIONER

## 2023-02-13 RX ORDER — FLUCONAZOLE 150 MG/1
150 TABLET ORAL ONCE
Qty: 1 TABLET | Refills: 0 | Status: SHIPPED | OUTPATIENT
Start: 2023-02-13 | End: 2023-02-13

## 2023-02-13 RX ORDER — DOXYCYCLINE HYCLATE 100 MG
100 TABLET ORAL DAILY
Qty: 90 TABLET | Refills: 1 | Status: SHIPPED | OUTPATIENT
Start: 2023-02-13 | End: 2023-08-12

## 2023-02-13 RX ORDER — ESTRADIOL 0.1 MG/G
0.25 CREAM VAGINAL DAILY
Qty: 30 G | Refills: 2 | Status: SHIPPED | OUTPATIENT
Start: 2023-02-13

## 2023-02-13 NOTE — PROGRESS NOTES
2023       HPI:     Name: Crista Villa  YOB: 1950    CC: Patient is a 67 y.o. presenting for evaluation of  recurrent urinary tract infection treatment . HPI: How long have you had this problem? Please rate the severity of your problem:   Anything make it better? Normal Cysto 22    Currently taking daily doxycycline and doing very well. No UTI's. Last UTI 10/28/22  Using estrogen cream.  Using Ellura Cranberry    Had recent yeast infection, successfully treated with Diflucan. Ob/Gyn History:    OB History    Para Term  AB Living   3 3           SAB IAB Ectopic Molar Multiple Live Births                    # Outcome Date GA Lbr Anderson/2nd Weight Sex Delivery Anes PTL Lv   3 Para            2 Para            1 Para              Past Medical History:   Past Medical History:   Diagnosis Date    Depression     Hypertension      Past Surgical History:   Past Surgical History:   Procedure Laterality Date    HYSTERECTOMY (CERVIX STATUS UNKNOWN)      ZULEMA AND BSO (CERVIX REMOVED)       Allergies: Allergies   Allergen Reactions    Clindamycin Diarrhea     Nausea   Nausea       Iodinated Contrast Media Hives     IVP Dye  Other reaction(s): hives      Amlodipine Swelling     ankles     Current Medications:  Current Outpatient Medications   Medication Sig Dispense Refill    CRANBERRY PO Take by mouth      fluconazole (DIFLUCAN) 150 MG tablet Take 1 tablet by mouth once for 1 dose 1 tablet 0    estradiol (ESTRACE VAGINAL) 0.1 MG/GM vaginal cream Place 0.25 g vaginally daily Apply 0.25g with fingertip to the vaginal opening every night at bedtime for 2 weeks, then decrease to twice weekly.  30 g 2    doxycycline hyclate (VIBRA-TABS) 100 MG tablet Take 1 tablet by mouth daily 90 tablet 1    doxycycline hyclate (VIBRA-TABS) 100 MG tablet TAKE 1 TABLET BY MOUTH DAILY 30 tablet 0    lisinopril (PRINIVIL;ZESTRIL) 10 MG tablet TAKE 1 TABLET BY MOUTH DAILY      escitalopram (LEXAPRO) 10 MG tablet Take 5 mg by mouth 2 times daily      omeprazole (PRILOSEC) 40 MG delayed release capsule Take 40 mg by mouth 2 times daily      atorvastatin (LIPITOR) 20 MG tablet Take 20 mg by mouth daily      VIBERZI 100 MG TABS TAKE 1 TABLET BY MOUTH TWICE DAILY      estradiol (ESTRACE) 0.1 MG/GM vaginal cream PLACE IN THE VAGINA EVERY THIRD NIGHT      gabapentin (NEURONTIN) 300 MG capsule TAKE 1 CAPSULE BY MOUTH EVERY NIGHT      zolpidem (AMBIEN) 10 MG tablet TAKE 1 TABLET BY MOUTH EVERY NIGHT AS NEEDED FOR SLEEP      Sod Fluoride-Potassium Nitrate (PREVIDENT 5000 ENAMEL PROTECT) 1.1-5 % GEL BRUSH WITH PASTE 1-2 TIMES DAILY      phenazopyridine (PYRIDIUM) 200 MG tablet TAKE ONE TABLET BY MOUTH THREE TIMES DAILY      loperamide (IMODIUM) 2 MG capsule Take 4 mg by mouth 3 times daily as needed      calcium citrate-vitamin D (CITRACAL+D) 315-200 MG-UNIT per tablet Take 1 tablet by mouth 2 times daily      Calcium Carbonate-Vitamin D 600-200 MG-UNIT CAPS Take 3 tablets by mouth daily      metoprolol succinate (TOPROL XL) 50 MG extended release tablet TAKE 1 TABLET BY MOUTH DAILY (Patient not taking: Reported on 2/13/2023)      Meth-Hyo-M Bl-Na Phos-Ph Sal (URIBEL) 118 MG CAPS TAKE 1 CAPSULE BY MOUTH EVERY 8 HOURS AS NEEDED FOR BLADDER PAIN (Patient not taking: Reported on 2/13/2023)      cefdinir (OMNICEF) 300 MG capsule TAKE 1 CAPSULE BY MOUTH TWICE DAILY FOR 7 DAYS (Patient not taking: Reported on 2/13/2023)       No current facility-administered medications for this visit. Social History:   Social History     Socioeconomic History    Marital status:      Spouse name: Not on file    Number of children: Not on file    Years of education: Not on file    Highest education level: Not on file   Occupational History    Not on file   Tobacco Use    Smoking status: Never    Smokeless tobacco: Never   Vaping Use    Vaping Use: Never used   Substance and Sexual Activity    Alcohol use:  Yes     Alcohol/week: 7.0 standard drinks     Types: 7 Glasses of wine per week    Drug use: Never    Sexual activity: Not Currently   Other Topics Concern    Not on file   Social History Narrative    Not on file     Social Determinants of Health     Financial Resource Strain: Not on file   Food Insecurity: Not on file   Transportation Needs: Not on file   Physical Activity: Not on file   Stress: Not on file   Social Connections: Not on file   Intimate Partner Violence: Not on file   Housing Stability: Not on file     Family History:   Family History   Problem Relation Age of Onset    Hypertension Mother     Hypertension Father     Cancer Father          Objective:     Vitals  Vitals:    02/13/23 1333   BP: 128/69   Pulse: 68   Resp: 16   Temp: 97.8 °F (36.6 °C)   SpO2: 97%     Physical Exam  Physical Exam  Vitals and nursing note reviewed. Constitutional:       Appearance: Normal appearance. HENT:      Head: Normocephalic. Eyes:      Conjunctiva/sclera: Conjunctivae normal.   Cardiovascular:      Rate and Rhythm: Normal rate. Pulmonary:      Effort: Pulmonary effort is normal.   Musculoskeletal:         General: Normal range of motion. Cervical back: Normal range of motion. Skin:     General: Skin is warm and dry. Neurological:      General: No focal deficit present. Mental Status: She is alert. Psychiatric:         Mood and Affect: Mood normal.         Behavior: Behavior normal.       No results found for this visit on 02/13/23. Assessment/Plan: Cheyanne Motta is a 67 y.o. female with   1. Recurrent UTI    2. Vaginal atrophy    -Recurrent UTI:  Stable  She has been free of infection since 10/2022. Continue topical estrogen cream  Continue cranberry (Ellura)  Continue Doxycycline:  Plan to wean next visit  Refills provided for 6 months     -Vaginal yeast:  Diflucan sent to pharmacy.   Patient to keep on hand    F/U 6 months  ADELINA Ibrahim - CNP        No orders of the defined types were placed in this encounter. Orders Placed This Encounter   Medications    fluconazole (DIFLUCAN) 150 MG tablet     Sig: Take 1 tablet by mouth once for 1 dose     Dispense:  1 tablet     Refill:  0    estradiol (ESTRACE VAGINAL) 0.1 MG/GM vaginal cream     Sig: Place 0.25 g vaginally daily Apply 0.25g with fingertip to the vaginal opening every night at bedtime for 2 weeks, then decrease to twice weekly.      Dispense:  30 g     Refill:  2    doxycycline hyclate (VIBRA-TABS) 100 MG tablet     Sig: Take 1 tablet by mouth daily     Dispense:  90 tablet     Refill:  [facility], APRN - CNP

## 2023-08-04 ENCOUNTER — TELEPHONE (OUTPATIENT)
Dept: UROGYNECOLOGY | Age: 73
End: 2023-08-04

## 2023-08-04 DIAGNOSIS — R39.15 URINARY URGENCY: Primary | ICD-10-CM

## 2023-08-04 DIAGNOSIS — R39.15 URINARY URGENCY: ICD-10-CM

## 2023-08-04 DIAGNOSIS — R35.0 URINARY FREQUENCY: ICD-10-CM

## 2023-08-04 RX ORDER — PHENAZOPYRIDINE HYDROCHLORIDE 200 MG/1
200 TABLET, FILM COATED ORAL 3 TIMES DAILY PRN
Qty: 9 TABLET | Refills: 0 | Status: SHIPPED | OUTPATIENT
Start: 2023-08-04 | End: 2023-08-07

## 2023-08-04 NOTE — TELEPHONE ENCOUNTER
Patient called this afternoon stating that she is a patient of Kit Worthington NP's for recurrent UTIs. Stated she feels as if she has a UTI currently and that her daily prophylaxis is no longer working for her. The patient expressed that she cannot hold off all weekend without any prescription for relief. After speaking with Kit Worthington NP, the RN informed patient she would highly recommend a urine culture at this time given her recurrent infections. Advised patient to go to a [address] [place] [address] lab to give a specimen for a urine culture. Likewise, RN offered 3 days of pyridium to help with symptoms in the meantime per Kit Worthington NP. Patient agreed to go to a lab and did want the Pyridium. RN placed orders for both urine culture and Pyridium at this time. Patient thankful for the call.

## 2023-08-07 LAB
BACTERIA UR CULT: ABNORMAL
ORGANISM: ABNORMAL

## 2023-08-07 NOTE — RESULT ENCOUNTER NOTE
Spoke with patient over the phone. Informed her urine culture results are in, she should continue taking her Cipro as prescribed. Patient has an appointment on 8/18/23. MERYL will be done if patient is symptomatic at this visit. She has no other concerns at this time.

## 2023-08-18 ENCOUNTER — OFFICE VISIT (OUTPATIENT)
Dept: UROGYNECOLOGY | Age: 73
End: 2023-08-18

## 2023-08-18 VITALS
OXYGEN SATURATION: 97 % | HEART RATE: 70 BPM | SYSTOLIC BLOOD PRESSURE: 111 MMHG | RESPIRATION RATE: 18 BRPM | DIASTOLIC BLOOD PRESSURE: 68 MMHG | TEMPERATURE: 97.9 F

## 2023-08-18 DIAGNOSIS — R35.0 URINARY FREQUENCY: Primary | ICD-10-CM

## 2023-08-18 DIAGNOSIS — R39.15 URINARY URGENCY: ICD-10-CM

## 2023-08-18 LAB
BILIRUBIN, POC: NORMAL
BLOOD URINE, POC: NORMAL
CLARITY, POC: CLEAR
COLOR, POC: YELLOW
GLUCOSE URINE, POC: NORMAL
KETONES, POC: NORMAL
LEUKOCYTE EST, POC: NORMAL
NITRITE, POC: NORMAL
PH, POC: 5
PROTEIN, POC: NORMAL
SPECIFIC GRAVITY, POC: 1.01
UROBILINOGEN, POC: NORMAL

## 2023-08-18 RX ORDER — FLUTICASONE PROPIONATE 50 MCG
2 SPRAY, SUSPENSION (ML) NASAL DAILY
COMMUNITY
Start: 2023-06-01

## 2023-08-18 RX ORDER — FLUCONAZOLE 150 MG/1
150 TABLET ORAL DAILY
Qty: 2 TABLET | Refills: 0 | Status: SHIPPED | OUTPATIENT
Start: 2023-08-18 | End: 2023-08-20

## 2023-08-18 NOTE — PROGRESS NOTES
8/18/2023       HPI:     Name: Karen Matta  YOB: 1950    CC: Patient is a 75 y.o. presenting for evaluation of  a 6 month follow up for recurrent UTIs . Patient's last UTI was 08/04/2023 in which she states was treated with Cipro. Patient does not report having any UTI symptoms today. Patient reports she no longer takes her daily Doxycyline. HPI: How long have you had this problem? years  Please rate the severity of your problem: moderate  Anything make it better? Ryan Espitia has maintained well for the past 6 months on Estrogen cream, cranberry (allura) and daily prophylaxis with doxycyline, which was to be stopped after 6 months. She has had only one UTI since February which was last  week.   The culture  was not sensitive to Doxycycline    She is very anxious to stop daily prophylaxis     Component    Organism Klebsiella aerogenes Abnormal     Urine Culture, Routine 25,000 CFU/ml    Resulting Agency [address] [place] [address] Lab        Susceptibility    Klebsiella aerogenes (1)    Antibiotic Interpretation Microscan  Method Status    amoxicillin-clavulanate Resistant >16/8 mcg/mL BACTERIAL SUSCEPTIBILITY PANEL BY ROSEMARY     ampicillin Resistant >16 mcg/mL BACTERIAL SUSCEPTIBILITY PANEL BY ROSEMARY     ampicillin-sulbactam Resistant >16/8 mcg/mL BACTERIAL SUSCEPTIBILITY PANEL BY ROSEMARY     ceFAZolin Resistant 4 mcg/mL BACTERIAL SUSCEPTIBILITY PANEL BY ROSEMARY     cefepime Sensitive <=2 mcg/mL BACTERIAL SUSCEPTIBILITY PANEL BY ROSEMARY     cefTRIAXone Sensitive <=1 mcg/mL BACTERIAL SUSCEPTIBILITY PANEL BY ROSEMARY     cefuroxime Resistant 16 mcg/mL BACTERIAL SUSCEPTIBILITY PANEL BY ROSEMARY     ciprofloxacin Sensitive <=1 mcg/mL BACTERIAL SUSCEPTIBILITY PANEL BY ROSEMARY     ertapenem Sensitive <=0.5 mcg/mL BACTERIAL SUSCEPTIBILITY PANEL BY ROSEMARY     gentamicin Sensitive <=4 mcg/mL BACTERIAL SUSCEPTIBILITY PANEL BY ROSEMARY     meropenem Sensitive <=1 mcg/mL BACTERIAL SUSCEPTIBILITY PANEL BY ROSEMARY     nitrofurantoin Sensitive <=32 mcg/mL

## 2023-08-20 LAB — BACTERIA UR CULT: NORMAL

## 2023-08-28 ENCOUNTER — PATIENT MESSAGE (OUTPATIENT)
Dept: UROGYNECOLOGY | Age: 73
End: 2023-08-28

## 2023-08-28 ENCOUNTER — TELEPHONE (OUTPATIENT)
Dept: UROGYNECOLOGY | Age: 73
End: 2023-08-28

## 2023-08-28 DIAGNOSIS — N39.0 RECURRENT UTI: Primary | ICD-10-CM

## 2023-08-28 NOTE — TELEPHONE ENCOUNTER
Patient states that she started having urinary frequency and burning over weekend. Dr. Sherif Carpenter called in Cipro BID x7 days, and patient started taking this med on Saturday. She has seen an improvement of symptoms, however she is hoping to start daily UTI prophylaxis again. She also wonders if her old prescription of AZO from from year ago is still ok to use. Per Miracle Gerardo, patient should finish out her Cipro. We will not collect a urine sample at completion of Cipro, as we require the sensitivities of the culture to be accurate to prescribe prophylaxis. Informed her that if she feels another UTI occurring, that we would collect a specimen at that time. Patient states that she couldn't get a specimen to a lab because [facility] are closed on saturdays - informed her that if she feels UTI symptoms over the weekend, she can either wait to call us on Monday or go to an Urgent Care to leave a specimen. Her old AZO should be ok to still use. Informed patient that Dr. Sherif Carpenter and Miracle Gerardo will have a discussion about what are the next best steps for her UTI treatment, and that someone from the office will reach out to her by end of business day tomorrow. Smita Luong wants both Dr. Sherif Carpenter and Miracle Gerardo to know that she cannot have another UTI. It is effecting her quality of life - She is hoping for quality of life, not quantity of life, and believes that UTI prophylaxis is necessary for this goal.     Encouraged patient that an office visit is not necessary at this time. We will wait to see what Mircale Gerardo and Dr. Sherif Carpenter advise next. Patient verbalizes understanding of all of the above, and has no further questions or concerns at this time. Encouraged to call back the office should any questions/concerns arise.  8/28/2023 at 1:53 PM.

## 2023-08-28 NOTE — TELEPHONE ENCOUNTER
Pt called and said she got a UTI over the weekend and SK put her on a prescription. He told her to call Monday and let Rehana Galvez know what has been going on.

## 2023-08-29 RX ORDER — TRIMETHOPRIM 100 MG/1
100 TABLET ORAL DAILY
Qty: 90 TABLET | Refills: 1 | Status: SHIPPED | OUTPATIENT
Start: 2023-08-29 | End: 2024-02-25

## 2023-10-05 ENCOUNTER — TELEPHONE (OUTPATIENT)
Dept: UROGYNECOLOGY | Age: 73
End: 2023-10-05

## 2023-10-05 RX ORDER — CIPROFLOXACIN 500 MG/1
500 TABLET, FILM COATED ORAL 2 TIMES DAILY
Qty: 14 TABLET | Refills: 0 | Status: SHIPPED | OUTPATIENT
Start: 2023-10-05 | End: 2023-10-12

## 2023-10-05 NOTE — TELEPHONE ENCOUNTER
Per Kit Worthington NP okay to send in Cipro to her local pharmacy to take on her trip - instructed patient to not take both her Trimpex and Cipro at the same time. Patient verbalized understanding.

## 2024-02-13 DIAGNOSIS — N39.0 RECURRENT UTI: ICD-10-CM

## 2024-02-14 RX ORDER — TRIMETHOPRIM 100 MG/1
100 TABLET ORAL DAILY
Qty: 90 TABLET | Refills: 1 | Status: SHIPPED | OUTPATIENT
Start: 2024-02-14 | End: 2024-08-12

## 2024-02-26 ENCOUNTER — OFFICE VISIT (OUTPATIENT)
Dept: UROGYNECOLOGY | Age: 74
End: 2024-02-26
Payer: MEDICARE

## 2024-02-26 VITALS
SYSTOLIC BLOOD PRESSURE: 128 MMHG | DIASTOLIC BLOOD PRESSURE: 75 MMHG | TEMPERATURE: 97.3 F | OXYGEN SATURATION: 98 % | RESPIRATION RATE: 18 BRPM | HEART RATE: 69 BPM

## 2024-02-26 DIAGNOSIS — N39.0 RECURRENT UTI: Primary | ICD-10-CM

## 2024-02-26 DIAGNOSIS — N95.2 VAGINAL ATROPHY: ICD-10-CM

## 2024-02-26 PROCEDURE — G8400 PT W/DXA NO RESULTS DOC: HCPCS | Performed by: NURSE PRACTITIONER

## 2024-02-26 PROCEDURE — 1036F TOBACCO NON-USER: CPT | Performed by: NURSE PRACTITIONER

## 2024-02-26 PROCEDURE — 1090F PRES/ABSN URINE INCON ASSESS: CPT | Performed by: NURSE PRACTITIONER

## 2024-02-26 PROCEDURE — G8427 DOCREV CUR MEDS BY ELIG CLIN: HCPCS | Performed by: NURSE PRACTITIONER

## 2024-02-26 PROCEDURE — G8484 FLU IMMUNIZE NO ADMIN: HCPCS | Performed by: NURSE PRACTITIONER

## 2024-02-26 PROCEDURE — G8421 BMI NOT CALCULATED: HCPCS | Performed by: NURSE PRACTITIONER

## 2024-02-26 PROCEDURE — 1123F ACP DISCUSS/DSCN MKR DOCD: CPT | Performed by: NURSE PRACTITIONER

## 2024-02-26 PROCEDURE — 99213 OFFICE O/P EST LOW 20 MIN: CPT | Performed by: NURSE PRACTITIONER

## 2024-02-26 PROCEDURE — 3017F COLORECTAL CA SCREEN DOC REV: CPT | Performed by: NURSE PRACTITIONER

## 2024-02-26 RX ORDER — TRIAMTERENE AND HYDROCHLOROTHIAZIDE 37.5; 25 MG/1; MG/1
0.5 TABLET ORAL DAILY
COMMUNITY
Start: 2024-02-12

## 2024-02-26 RX ORDER — ESTRADIOL 0.1 MG/G
0.25 CREAM VAGINAL DAILY
Qty: 30 G | Refills: 2 | Status: SHIPPED | OUTPATIENT
Start: 2024-02-26

## 2024-02-26 RX ORDER — BENZONATATE 100 MG/1
100 CAPSULE ORAL EVERY 6 HOURS PRN
COMMUNITY
Start: 2023-11-14

## 2024-02-26 NOTE — PROGRESS NOTES
this visit.     Social History:   Social History     Socioeconomic History    Marital status:      Spouse name: Not on file    Number of children: Not on file    Years of education: Not on file    Highest education level: Not on file   Occupational History    Not on file   Tobacco Use    Smoking status: Never    Smokeless tobacco: Never   Vaping Use    Vaping Use: Never used   Substance and Sexual Activity    Alcohol use: Yes     Alcohol/week: 7.0 standard drinks of alcohol     Types: 7 Glasses of wine per week    Drug use: Never    Sexual activity: Not Currently   Other Topics Concern    Not on file   Social History Narrative    Not on file     Social Determinants of Health     Financial Resource Strain: Not on file   Food Insecurity: Not on file   Transportation Needs: Not on file   Physical Activity: Not on file   Stress: Not on file   Social Connections: Not on file   Intimate Partner Violence: Not on file   Housing Stability: Not on file     Family History:   Family History   Problem Relation Age of Onset    Hypertension Mother     Hypertension Father     Cancer Father          Objective:     Vitals  Vitals:    02/26/24 1121   BP: 128/75   Pulse: 69   Resp: 18   Temp: 97.3 °F (36.3 °C)   SpO2: 98%     Physical Exam  Physical Exam  Vitals and nursing note reviewed.   Constitutional:       Appearance: Normal appearance.   HENT:      Head: Normocephalic.   Eyes:      Conjunctiva/sclera: Conjunctivae normal.   Cardiovascular:      Rate and Rhythm: Normal rate.   Pulmonary:      Effort: Pulmonary effort is normal.   Musculoskeletal:         General: Normal range of motion.      Cervical back: Normal range of motion.   Skin:     General: Skin is warm and dry.   Neurological:      General: No focal deficit present.      Mental Status: She is alert.   Psychiatric:         Mood and Affect: Mood normal.         Behavior: Behavior normal.         No results found for this visit on 02/26/24.    Assessment/Plan:

## 2024-08-19 DIAGNOSIS — N39.0 RECURRENT UTI: ICD-10-CM

## 2024-08-20 RX ORDER — TRIMETHOPRIM 100 MG/1
100 TABLET ORAL DAILY
Qty: 90 TABLET | Refills: 1 | OUTPATIENT
Start: 2024-08-20 | End: 2025-02-16

## 2024-08-21 ENCOUNTER — TELEPHONE (OUTPATIENT)
Dept: UROGYNECOLOGY | Age: 74
End: 2024-08-21

## 2024-08-21 NOTE — TELEPHONE ENCOUNTER
Patient requesting refill on Trimpex, discovered patient does have 5 pills left (enough to get her through next appt if taking daily), but see in last office note that patient was recommended to take every other day leading up to follow up. Recommended patient start taking every other day as instructed, patient would like this RN to clarify with NP that this is necessary. Patient would like to continue daily until she meets with provider on 8/26/24. This RN to clarify with NP and will call patient back.    Spoke with NP and confirmed that recommendation is to space medication out to every other day - she verbalized understanding and will start to do that now. Will follow up at visit.

## 2024-08-26 ENCOUNTER — OFFICE VISIT (OUTPATIENT)
Dept: UROGYNECOLOGY | Age: 74
End: 2024-08-26
Payer: MEDICARE

## 2024-08-26 VITALS
DIASTOLIC BLOOD PRESSURE: 76 MMHG | HEART RATE: 72 BPM | TEMPERATURE: 97.6 F | OXYGEN SATURATION: 97 % | SYSTOLIC BLOOD PRESSURE: 125 MMHG | RESPIRATION RATE: 18 BRPM

## 2024-08-26 DIAGNOSIS — N39.0 RECURRENT UTI: Primary | ICD-10-CM

## 2024-08-26 DIAGNOSIS — N95.2 VAGINAL ATROPHY: ICD-10-CM

## 2024-08-26 PROCEDURE — 1090F PRES/ABSN URINE INCON ASSESS: CPT | Performed by: NURSE PRACTITIONER

## 2024-08-26 PROCEDURE — G8427 DOCREV CUR MEDS BY ELIG CLIN: HCPCS | Performed by: NURSE PRACTITIONER

## 2024-08-26 PROCEDURE — 3017F COLORECTAL CA SCREEN DOC REV: CPT | Performed by: NURSE PRACTITIONER

## 2024-08-26 PROCEDURE — 1123F ACP DISCUSS/DSCN MKR DOCD: CPT | Performed by: NURSE PRACTITIONER

## 2024-08-26 PROCEDURE — 1036F TOBACCO NON-USER: CPT | Performed by: NURSE PRACTITIONER

## 2024-08-26 PROCEDURE — G8400 PT W/DXA NO RESULTS DOC: HCPCS | Performed by: NURSE PRACTITIONER

## 2024-08-26 PROCEDURE — 99213 OFFICE O/P EST LOW 20 MIN: CPT | Performed by: NURSE PRACTITIONER

## 2024-08-26 PROCEDURE — G8421 BMI NOT CALCULATED: HCPCS | Performed by: NURSE PRACTITIONER

## 2024-08-26 RX ORDER — PHENAZOPYRIDINE HYDROCHLORIDE 100 MG/1
100 TABLET, FILM COATED ORAL 3 TIMES DAILY PRN
Qty: 18 TABLET | Refills: 0 | Status: SHIPPED | OUTPATIENT
Start: 2024-08-26

## 2024-08-26 RX ORDER — TRIMETHOPRIM 100 MG/1
100 TABLET ORAL EVERY OTHER DAY
Qty: 100 TABLET | Refills: 0 | Status: SHIPPED | OUTPATIENT
Start: 2024-08-26 | End: 2025-03-14

## 2024-08-26 NOTE — PROGRESS NOTES
2024       HPI:     Name: Alie Ogden  YOB: 1950    CC: Patient is a 74 y.o. presenting for evaluation of recurrent UTIs.       HPI:     How long have you had this problem?  years  Please rate the severity of your problem: moderate  Anything make it better?   Alie Alvarado has been doing well with no UTI's in the past year.  She is currently on Trimpex daily, estrogen cream 3 times weekly and taking her daily cranberry supplement    Most recent positive culture on 2023- treated with Cipro BID x 7 days.       Component    Organism Klebsiella aerogenes Abnormal    Urine Culture, Routine 25,000 CFU/ml   Resulting Agency [facility]      R- Amoxicillin-Clavulanate, Ampicillin, Cefazolin, Cefuroxime      PMH: No changes  PSH: No changes      Component  Ref Range & Units 2 mo ago Comments   Sodium  136 - 145 mmol/L 137    Potassium  3.5 - 5.0 mmol/L 4.3    Chloride  98 - 107 mmol/L 98    Total CO2  22 - 29 mmol/L 26    Anion Gap  7 - 16 mmol/L 13    Calcium  8.8 - 10.4 mg/dL 9.8    Glucose Lvl  70 - 99 mg/dL 112 High     BUN  8 - 23 mg/dL 18    Creatinine  0.51 - 1.30 mg/dL 0.76    Albumin  3.2 - 4.6 gm/dL 4.4    Total Protein  6.4 - 8.3 gm/dL 7.2    Bili Total  0.2 - 1.3 mg/dL 0.5    ALT  <=41 U/L 15    AST  <=40 U/L 19    Alk Phos  36 - 123 U/L 65    eGFR (CKD-EPIcr )  >=60 mL/min/1.73 m2 81        Ob/Gyn History:    OB History    Para Term  AB Living   3 3           SAB IAB Ectopic Molar Multiple Live Births                    # Outcome Date GA Lbr Anderson/2nd Weight Sex Type Anes PTL Lv   3 Para            2 Para            1 Para              Past Medical History:   Past Medical History:   Diagnosis Date    Depression     Hypertension      Past Surgical History:   Past Surgical History:   Procedure Laterality Date    HYSTERECTOMY (CERVIX STATUS UNKNOWN)      ZULEMA AND BSO (CERVIX REMOVED)       Allergies:   Allergies   Allergen Reactions    Clindamycin Diarrhea     Nausea  hours as needed (Patient not taking: Reported on 2/26/2024)      estradiol (ESTRACE VAGINAL) 0.1 MG/GM vaginal cream Place 0.25 g vaginally daily Apply 0.25g with fingertip to the vaginal opening every night at bedtime for 2 weeks, then decrease to twice weekly. 30 g 2    Meth-Hyo-M Bl-Na Phos-Ph Sal (URIBEL) 118 MG CAPS  (Patient not taking: Reported on 2/26/2024)      loperamide (IMODIUM) 2 MG capsule Take 2 capsules by mouth 3 times daily as needed      cefdinir (OMNICEF) 300 MG capsule  (Patient not taking: Reported on 2/26/2024)       No current facility-administered medications for this visit.     Social History:   Social History     Socioeconomic History    Marital status:      Spouse name: Not on file    Number of children: Not on file    Years of education: Not on file    Highest education level: Not on file   Occupational History    Not on file   Tobacco Use    Smoking status: Never    Smokeless tobacco: Never   Vaping Use    Vaping status: Never Used   Substance and Sexual Activity    Alcohol use: Yes     Alcohol/week: 7.0 standard drinks of alcohol     Types: 7 Glasses of wine per week    Drug use: Never    Sexual activity: Not Currently   Other Topics Concern    Not on file   Social History Narrative    Not on file     Social Determinants of Health     Financial Resource Strain: Not on file   Food Insecurity: Not on file   Transportation Needs: Not on file   Physical Activity: Not on file   Stress: Not on file   Social Connections: Not on file   Intimate Partner Violence: Not on file   Housing Stability: Not on file     Family History:   Family History   Problem Relation Age of Onset    Hypertension Mother     Hypertension Father     Cancer Father          Objective:     Vitals  Vitals:    08/26/24 1125   BP: 125/76   Pulse: 72   Resp: 18   Temp: 97.6 °F (36.4 °C)   SpO2: 97%     Physical Exam  Physical Exam    No results found for this visit on 08/26/24.    Assessment/Plan:     Alie Ogden is

## 2024-12-03 RX ORDER — ESTRADIOL 0.1 MG/G
CREAM VAGINAL
Qty: 42.5 G | Refills: 0 | Status: SHIPPED | OUTPATIENT
Start: 2024-12-03

## 2024-12-03 NOTE — TELEPHONE ENCOUNTER
Per Magy Duarte NP's last note - patient to continue vaginal estrogen cream use and follow up in 6 months. Follow up scheduled for March. Refill approved at this time.

## 2025-01-27 RX ORDER — TRIMETHOPRIM 100 MG/1
100 TABLET ORAL EVERY OTHER DAY
Qty: 100 TABLET | Refills: 0 | OUTPATIENT
Start: 2025-01-27 | End: 2025-08-15

## 2025-01-27 RX ORDER — TRIMETHOPRIM 100 MG/1
100 TABLET ORAL EVERY OTHER DAY
Qty: 20 TABLET | Refills: 0 | Status: SHIPPED | OUTPATIENT
Start: 2025-01-27 | End: 2025-03-07

## 2025-01-27 NOTE — TELEPHONE ENCOUNTER
Per Magy Duarte NP, discussed switching to D-mannose in near future and getting off Trimpex. Electronically signed by Ricardo Issa RN on 1/27/2025 at 8:22 AM

## 2025-01-27 NOTE — TELEPHONE ENCOUNTER
Pt requesting partial amt of Trimpex for month long trip to [place].  Per Magy Duarte NP.  Rx approved and sent to [facility] pharmacy for #20  Trimpex. Pt has appt on 3/3/25.  Pt notified RX was called in.  Voices no other concerns at this time.    Electronically signed by Lisa Cornell RN on 1/27/2025 at 11:36 AM

## 2025-03-03 ENCOUNTER — OFFICE VISIT (OUTPATIENT)
Dept: UROGYNECOLOGY | Age: 75
End: 2025-03-03
Payer: MEDICARE

## 2025-03-03 VITALS
SYSTOLIC BLOOD PRESSURE: 145 MMHG | OXYGEN SATURATION: 96 % | DIASTOLIC BLOOD PRESSURE: 77 MMHG | TEMPERATURE: 97.4 F | RESPIRATION RATE: 16 BRPM | HEART RATE: 67 BPM

## 2025-03-03 DIAGNOSIS — N95.2 VAGINAL ATROPHY: ICD-10-CM

## 2025-03-03 DIAGNOSIS — N39.0 RECURRENT UTI: Primary | ICD-10-CM

## 2025-03-03 PROCEDURE — 1090F PRES/ABSN URINE INCON ASSESS: CPT | Performed by: NURSE PRACTITIONER

## 2025-03-03 PROCEDURE — G8421 BMI NOT CALCULATED: HCPCS | Performed by: NURSE PRACTITIONER

## 2025-03-03 PROCEDURE — 1123F ACP DISCUSS/DSCN MKR DOCD: CPT | Performed by: NURSE PRACTITIONER

## 2025-03-03 PROCEDURE — 1159F MED LIST DOCD IN RCRD: CPT | Performed by: NURSE PRACTITIONER

## 2025-03-03 PROCEDURE — 1160F RVW MEDS BY RX/DR IN RCRD: CPT | Performed by: NURSE PRACTITIONER

## 2025-03-03 PROCEDURE — 99213 OFFICE O/P EST LOW 20 MIN: CPT | Performed by: NURSE PRACTITIONER

## 2025-03-03 PROCEDURE — G8427 DOCREV CUR MEDS BY ELIG CLIN: HCPCS | Performed by: NURSE PRACTITIONER

## 2025-03-03 PROCEDURE — G8400 PT W/DXA NO RESULTS DOC: HCPCS | Performed by: NURSE PRACTITIONER

## 2025-03-03 PROCEDURE — 3017F COLORECTAL CA SCREEN DOC REV: CPT | Performed by: NURSE PRACTITIONER

## 2025-03-03 PROCEDURE — 1036F TOBACCO NON-USER: CPT | Performed by: NURSE PRACTITIONER

## 2025-03-03 RX ORDER — TRIMETHOPRIM 100 MG/1
100 TABLET ORAL WEEKLY
Qty: 30 TABLET | Refills: 0 | Status: SHIPPED | OUTPATIENT
Start: 2025-03-03 | End: 2025-09-23

## 2025-03-03 RX ORDER — CIPROFLOXACIN 500 MG/1
500 TABLET, FILM COATED ORAL 2 TIMES DAILY
Qty: 14 TABLET | Refills: 0 | Status: SHIPPED | OUTPATIENT
Start: 2025-03-03 | End: 2025-03-10

## 2025-03-03 NOTE — PROGRESS NOTES
3/3/2025       HPI:     Name: Alie Ogden  YOB: 1950    CC: Patient is a 74 y.o. presenting for evaluation of  recurrent UTI's .   HPI: How long have you had this problem?  years  Please rate the severity of your problem: none  Anything make it better? Patient reports her UTI's are under control, and she is on trimpex every other day.     Alie Alvarado presents with her  for follow up visit related to her history of Recurrent UTI's.  Of note, she is now using a cane secondary to continued progressing muscle weakness with h/o Polio    She has been under our care for Geremias's since 2022.  She has been free of UTI since Aug, 2023, it was at this time she had been taken off her daily prophylaxis 10 days prior  She was started on daily Trimpex 10/2023 and took this daily until 2024 at which time she began taking Trimpex every other day.    She continues topical estrogen cream, twice daily cranberry (Allura) and is currently on Trimpex QOD.     Asymptomatic today        Ob/Gyn History:    OB History    Para Term  AB Living   3 3           SAB IAB Ectopic Molar Multiple Live Births                    # Outcome Date GA Lbr Anderson/2nd Weight Sex Type Anes PTL Lv   3 Para            2 Para            1 Para              Past Medical History:   Past Medical History:   Diagnosis Date    Depression     Hypertension      Past Surgical History:   Past Surgical History:   Procedure Laterality Date    HYSTERECTOMY (CERVIX STATUS UNKNOWN)      ZULEMA AND BSO (CERVIX REMOVED)       Allergies:   Allergies   Allergen Reactions    Clindamycin Diarrhea     Nausea   Nausea       Iodinated Contrast Media Hives     IVP Dye  Other reaction(s): hives      Amlodipine Swelling     ankles     Current Medications:  Current Outpatient Medications   Medication Sig Dispense Refill    ciprofloxacin (CIPRO) 500 MG tablet Take 1 tablet by mouth 2 times daily for 7 days 14 tablet 0    trimethoprim (TRIMPEX) 100 MG

## 2025-03-07 RX ORDER — ESTRADIOL 0.1 MG/G
CREAM VAGINAL
Qty: 42.5 G | Refills: 0 | Status: SHIPPED | OUTPATIENT
Start: 2025-03-07

## 2025-04-14 ENCOUNTER — TELEPHONE (OUTPATIENT)
Dept: UROGYNECOLOGY | Age: 75
End: 2025-04-14

## 2025-04-14 NOTE — TELEPHONE ENCOUNTER
Called patient back, answered questions she had clarifying cranberry supplement she takes. No other questions at this time.

## 2025-08-15 RX ORDER — CIPROFLOXACIN 500 MG/1
TABLET, FILM COATED ORAL
Qty: 2 TABLET | Refills: 0 | Status: SHIPPED | OUTPATIENT
Start: 2025-08-15

## 2025-08-22 ENCOUNTER — TELEPHONE (OUTPATIENT)
Dept: UROGYNECOLOGY | Age: 75
End: 2025-08-22

## 2025-08-22 RX ORDER — FLUCONAZOLE 150 MG/1
150 TABLET ORAL ONCE
Qty: 1 TABLET | Refills: 0 | Status: SHIPPED | OUTPATIENT
Start: 2025-08-22 | End: 2025-08-22

## 2025-08-29 ENCOUNTER — TELEPHONE (OUTPATIENT)
Dept: UROGYNECOLOGY | Age: 75
End: 2025-08-29

## 2025-08-29 DIAGNOSIS — N39.0 RECURRENT UTI: ICD-10-CM

## 2025-08-29 RX ORDER — CIPROFLOXACIN 500 MG/1
500 TABLET, FILM COATED ORAL 2 TIMES DAILY
Qty: 14 TABLET | Refills: 0 | Status: SHIPPED | OUTPATIENT
Start: 2025-08-29 | End: 2025-09-05

## 2025-09-01 LAB
BACTERIA UR CULT: ABNORMAL
ORGANISM: ABNORMAL

## 2025-09-03 ENCOUNTER — OFFICE VISIT (OUTPATIENT)
Dept: UROGYNECOLOGY | Age: 75
End: 2025-09-03
Payer: MEDICARE

## 2025-09-03 VITALS
TEMPERATURE: 97.6 F | DIASTOLIC BLOOD PRESSURE: 70 MMHG | HEART RATE: 77 BPM | SYSTOLIC BLOOD PRESSURE: 133 MMHG | RESPIRATION RATE: 18 BRPM | OXYGEN SATURATION: 97 %

## 2025-09-03 DIAGNOSIS — B37.9 YEAST INFECTION: ICD-10-CM

## 2025-09-03 DIAGNOSIS — N39.0 RECURRENT UTI: Primary | ICD-10-CM

## 2025-09-03 PROCEDURE — G8421 BMI NOT CALCULATED: HCPCS | Performed by: NURSE PRACTITIONER

## 2025-09-03 PROCEDURE — 1036F TOBACCO NON-USER: CPT | Performed by: NURSE PRACTITIONER

## 2025-09-03 PROCEDURE — G8427 DOCREV CUR MEDS BY ELIG CLIN: HCPCS | Performed by: NURSE PRACTITIONER

## 2025-09-03 PROCEDURE — 99213 OFFICE O/P EST LOW 20 MIN: CPT | Performed by: NURSE PRACTITIONER

## 2025-09-03 PROCEDURE — 1159F MED LIST DOCD IN RCRD: CPT | Performed by: NURSE PRACTITIONER

## 2025-09-03 PROCEDURE — 1160F RVW MEDS BY RX/DR IN RCRD: CPT | Performed by: NURSE PRACTITIONER

## 2025-09-03 PROCEDURE — 1090F PRES/ABSN URINE INCON ASSESS: CPT | Performed by: NURSE PRACTITIONER

## 2025-09-03 PROCEDURE — G8399 PT W/DXA RESULTS DOCUMENT: HCPCS | Performed by: NURSE PRACTITIONER

## 2025-09-03 PROCEDURE — 1123F ACP DISCUSS/DSCN MKR DOCD: CPT | Performed by: NURSE PRACTITIONER

## 2025-09-03 RX ORDER — PHENAZOPYRIDINE HYDROCHLORIDE 100 MG/1
100 TABLET, FILM COATED ORAL 3 TIMES DAILY PRN
Qty: 24 TABLET | Refills: 0 | Status: SHIPPED | OUTPATIENT
Start: 2025-09-03

## 2025-09-03 RX ORDER — FLUCONAZOLE 150 MG/1
150 TABLET ORAL EVERY OTHER DAY
Qty: 3 TABLET | Refills: 0 | Status: SHIPPED | OUTPATIENT
Start: 2025-09-03 | End: 2025-09-08

## 2025-09-03 RX ORDER — CEFDINIR 300 MG/1
CAPSULE ORAL
Qty: 28 CAPSULE | Refills: 0 | Status: SHIPPED | OUTPATIENT
Start: 2025-09-03